# Patient Record
Sex: FEMALE | Race: OTHER | HISPANIC OR LATINO
[De-identification: names, ages, dates, MRNs, and addresses within clinical notes are randomized per-mention and may not be internally consistent; named-entity substitution may affect disease eponyms.]

---

## 2018-02-02 PROBLEM — Z00.00 ENCOUNTER FOR PREVENTIVE HEALTH EXAMINATION: Status: ACTIVE | Noted: 2018-02-02

## 2018-02-05 ENCOUNTER — APPOINTMENT (OUTPATIENT)
Dept: ORTHOPEDIC SURGERY | Facility: CLINIC | Age: 58
End: 2018-02-05
Payer: COMMERCIAL

## 2018-02-05 DIAGNOSIS — M79.9 SOFT TISSUE DISORDER, UNSPECIFIED: ICD-10-CM

## 2018-02-05 PROCEDURE — 99203 OFFICE O/P NEW LOW 30 MIN: CPT

## 2018-11-07 ENCOUNTER — RESULT REVIEW (OUTPATIENT)
Age: 58
End: 2018-11-07

## 2019-07-09 ENCOUNTER — TRANSCRIPTION ENCOUNTER (OUTPATIENT)
Age: 59
End: 2019-07-09

## 2020-11-25 ENCOUNTER — TRANSCRIPTION ENCOUNTER (OUTPATIENT)
Age: 60
End: 2020-11-25

## 2021-04-08 ENCOUNTER — TRANSCRIPTION ENCOUNTER (OUTPATIENT)
Age: 61
End: 2021-04-08

## 2021-11-29 ENCOUNTER — TRANSCRIPTION ENCOUNTER (OUTPATIENT)
Age: 61
End: 2021-11-29

## 2022-08-27 ENCOUNTER — NON-APPOINTMENT (OUTPATIENT)
Age: 62
End: 2022-08-27

## 2022-09-01 ENCOUNTER — NON-APPOINTMENT (OUTPATIENT)
Age: 62
End: 2022-09-01

## 2022-10-03 ENCOUNTER — EMERGENCY (EMERGENCY)
Facility: HOSPITAL | Age: 62
LOS: 1 days | Discharge: HOME | End: 2022-10-03
Attending: EMERGENCY MEDICINE
Payer: COMMERCIAL

## 2022-10-03 VITALS
SYSTOLIC BLOOD PRESSURE: 170 MMHG | WEIGHT: 220.02 LBS | TEMPERATURE: 98 F | HEIGHT: 64.17 IN | OXYGEN SATURATION: 98 % | HEART RATE: 89 BPM | DIASTOLIC BLOOD PRESSURE: 80 MMHG | RESPIRATION RATE: 18 BRPM

## 2022-10-03 PROCEDURE — 99284 EMERGENCY DEPT VISIT MOD MDM: CPT

## 2022-10-03 NOTE — ED ADULT TRIAGE NOTE - CHIEF COMPLAINT QUOTE
nose bleed on and off for a month went to specialist today and had  procedure done to nose, but now bleeding noted persistent as per daughter

## 2022-10-04 VITALS
RESPIRATION RATE: 18 BRPM | TEMPERATURE: 98 F | OXYGEN SATURATION: 98 % | DIASTOLIC BLOOD PRESSURE: 83 MMHG | HEART RATE: 63 BPM | SYSTOLIC BLOOD PRESSURE: 133 MMHG

## 2022-10-04 PROCEDURE — 99283 EMERGENCY DEPT VISIT LOW MDM: CPT

## 2022-10-04 RX ORDER — TRANEXAMIC ACID 100 MG/ML
5 INJECTION, SOLUTION INTRAVENOUS ONCE
Refills: 0 | Status: COMPLETED | OUTPATIENT
Start: 2022-10-04 | End: 2022-10-04

## 2022-10-04 RX ADMIN — TRANEXAMIC ACID 5 MILLILITER(S): 100 INJECTION, SOLUTION INTRAVENOUS at 01:28

## 2022-10-04 NOTE — ED PROVIDER NOTE - OBJECTIVE STATEMENT
Declines  via iPad that I brought into the room, uses daughter at bedside.  62yoF with h/o hypothyr, HLD, on ASA 81 and no other antiplt/coag agents, presents with epistaxis. States has had intermittent epistaxis x 1 mth, saw ENT today and had cautery, however 10pm tonight began bleeding again. Denies all other symptoms and concerns.

## 2022-10-04 NOTE — ED PROVIDER NOTE - PHYSICAL EXAMINATION
Afebrile, hemodynamically stable, saturating well  NAD, well appearing, sitting comfortably in bed, no WOB, speaking full sentences  Head NCAT  EOMI grossly, anicteric  MMM  Small ant bleed  No JVD  RRR, nml S1/S2, no m/r/g  Lungs CTAB, no w/r/r  Abd soft, NT, ND, nml BS, no rebound or guarding  AAO, CN's 3-12 grossly intact  POWERS spontaneously, no leg cyanosis or edema  Skin warm, well perfused, no rashes or hives Afebrile, hemodynamically stable, saturating well  NAD, well appearing, sitting comfortably in bed, no WOB, speaking full sentences  Head NCAT  EOMI grossly, anicteric  MMM  Small ant L nare bleed  RRR  Breathing comfortably on RA  AAO, CN's 3-12 grossly intact  POWERS spontaneously, no leg cyanosis or edema  Skin warm, well perfused, no petechiae

## 2022-10-04 NOTE — ED PROVIDER NOTE - CLINICAL SUMMARY MEDICAL DECISION MAKING FREE TEXT BOX
Epistaxis initially controlled by silver nitrate however rebled. This was stopped with atomized TXA. Observed with no rpt. No e/o other significant cause of bleeding. Patient is well appearing, NAD, afebrile, hemodynamically stable. Discharged with instructions in further symptomatic care, return precautions, and need for close ENT f/u.

## 2022-10-04 NOTE — ED PROVIDER NOTE - PATIENT PORTAL LINK FT
You can access the FollowMyHealth Patient Portal offered by [facility] by registering at the following website: [email or web address]. By joining [employer]’s FollowMyHealth portal, you will also be able to view your health information using other applications (apps) compatible with our system.

## 2022-10-04 NOTE — ED PROVIDER NOTE - NSFOLLOWUPINSTRUCTIONS_ED_ALL_ED_FT
Please follow up with your ENT doctor tomorrow.  Please return to the emergency department if you have worsening bleeding, dizziness, fever, or any other symptoms.    Por favor, lovely un seguimiento con phillips otorrinolaringólogo mañana.  Regrese al departamento de emergencias si tiene un empeoramiento del sangrado, mareos, fiebre o cualquier otro síntoma.    Hemorragia nasal en los adultos    Nosebleed, Adult    Cuando hay hemorragia nasal, sale nannette de la nariz. Las hemorragias nasales son perla afección frecuente y pueden deberse a muchos factores. Por lo general, no indican un problema médico grave.    Siga estas instrucciones en phillips casa:    Si tiene perla hemorragia nasal:   •Siéntese.  •Incline la balbina un poco hacia adelante.  •Siga estos pasos:  1.Presione la nariz con perla toalla o un pañuelo de papel limpios.  2.Continúe presionando la nariz yuri 5 minutos. No deje de hacerlo.  3.Después de 5 minutos, deje de presionar la nariz.  4.Si aún sangra, vuelva a repetir estos pasos. Continúe repitiendo estos pasos hasta que el sangrado se detenga.  • No coloque pañuelos de papel ni otras cosas en la nariz para detener el sangrado.  •Evite acostarse o colocar la balbina hacia atrás.  •Use un aerosol nasal descongestivo según lo indicado por phillips médico.    Después de perla hemorragia nasal:   •Trate de no sonarse ni resoplarse la nariz yuri varias horas.  •Trate de no hacer esfuerzos, levantar objetos ni doblar la cintura para agacharse yuri varios días.  •La aspirina y los medicamentos anticoagulantes aumentan la probabilidad de sangrados. Si mayela estos medicamentos:  •Pregunte a phillips médico si debe interrumpirlos o si debe cambiar la cantidad que mayela.  •No deje de emily los medicamentos excepto que el médico se lo haya indicado.  •Si la causa de la hemorragia fue la sequedad, use gel o aerosol nasal de solución salina de venta nae y un humidificador según se lo haya indicado el médico. [place] mantendrá el interior de la nariz húmeda y le permitirá curarse. Si necesita usar kyra de estos productos:  •Elija kyra que sea soluble en agua.   •Use solamente la cantidad que necesita y con la frecuencia necesaria.   •No se acueste inmediatamente después de usarlo.  •Si tiene hemorragias nasales con frecuencia, hable con el médico sobre los tratamientos. Estos incluyen:  •Cauterización nasal. Se utiliza un hisopo con perla sustancia química o un dispositivo eléctrico con el que se queman ligeramente los vasos sanguíneos diminutos que están dentro de la nariz. [place] ayuda a detener o prevenir las hemorragias nasales.  •Taponamiento nasal. Se coloca perla gasa u otro material en la nariz para ejercer presión omer sobre la romeo de la hemorragia.    Comuníquese con un médico si:  •Tiene fiebre.  •Tiene hemorragias nasales con frecuencia.  •Tiene hemorragias nasales con más frecuencia de lo habitual.  •Presenta moretones con mucha facilidad.  •Tiene algo metido en la nariz.  •Tiene sangrado en la boca.  •Vomita o libera perla sustancia marrón al toser.  •Tiene perla hemorragia nasal después de comenzar un medicamento nuevo.    Solicite ayuda de inmediato si:  •Tiene perla hemorragia nasal después de caerse o lastimarse la balbina.  •Tiene perla hemorragia nasal que no desaparece después de 20 minutos.  •Se siente mareado o débil.  •Tiene hemorragias fuera de lo común en otras partes del cuerpo.  •Tiene moretones fuera de lo común en otras partes del cuerpo.  •Transpira.  •Vomita nannette.    Resumen  •Las hemorragias nasales son frecuentes. Por lo general, no indican un problema médico grave.  •Si tiene perla hemorragia nasal, siéntese e incline la balbina un poco hacia adelante. Presione la nariz con un pañuelo de papel limpio yuri 5 minutos.  •Utilice un aerosol salino o un gel salino y un humidificador según lo indicado por phillips médico.  •Busque ayuda de inmediato si la hemorragia nasal no desaparece después de 20 minutos.    Esta información no tiene roly fin reemplazar el consejo del médico. Asegúrese de hacerle al médico cualquier pregunta que tenga.

## 2022-12-27 ENCOUNTER — NON-APPOINTMENT (OUTPATIENT)
Age: 62
End: 2022-12-27

## 2023-04-09 ENCOUNTER — EMERGENCY (EMERGENCY)
Facility: HOSPITAL | Age: 63
LOS: 1 days | Discharge: HOME | End: 2023-04-09
Attending: EMERGENCY MEDICINE
Payer: COMMERCIAL

## 2023-04-09 VITALS
WEIGHT: 222.01 LBS | RESPIRATION RATE: 17 BRPM | SYSTOLIC BLOOD PRESSURE: 122 MMHG | HEART RATE: 84 BPM | HEIGHT: 63 IN | OXYGEN SATURATION: 96 % | DIASTOLIC BLOOD PRESSURE: 76 MMHG | TEMPERATURE: 99 F

## 2023-04-09 VITALS
OXYGEN SATURATION: 97 % | RESPIRATION RATE: 17 BRPM | DIASTOLIC BLOOD PRESSURE: 66 MMHG | SYSTOLIC BLOOD PRESSURE: 109 MMHG | TEMPERATURE: 99 F | HEART RATE: 78 BPM

## 2023-04-09 LAB
FLUBV RNA SPEC QL NAA+PROBE: DETECTED
RAPID RVP RESULT: DETECTED
SARS-COV-2 RNA SPEC QL NAA+PROBE: SIGNIFICANT CHANGE UP

## 2023-04-09 PROCEDURE — 71045 X-RAY EXAM CHEST 1 VIEW: CPT

## 2023-04-09 PROCEDURE — 99284 EMERGENCY DEPT VISIT MOD MDM: CPT | Mod: 25

## 2023-04-09 PROCEDURE — 0225U NFCT DS DNA&RNA 21 SARSCOV2: CPT

## 2023-04-09 PROCEDURE — 99284 EMERGENCY DEPT VISIT MOD MDM: CPT

## 2023-04-09 PROCEDURE — 71045 X-RAY EXAM CHEST 1 VIEW: CPT | Mod: 26

## 2023-04-09 RX ORDER — IBUPROFEN 200 MG
600 TABLET ORAL ONCE
Refills: 0 | Status: COMPLETED | OUTPATIENT
Start: 2023-04-09 | End: 2023-04-09

## 2023-04-09 RX ORDER — GUAIFENESIN/DEXTROMETHORPHAN 600MG-30MG
10 TABLET, EXTENDED RELEASE 12 HR ORAL
Qty: 280 | Refills: 0
Start: 2023-04-09 | End: 2023-04-15

## 2023-04-09 RX ORDER — IBUPROFEN 200 MG
1 TABLET ORAL
Qty: 28 | Refills: 0
Start: 2023-04-09 | End: 2023-04-15

## 2023-04-09 RX ORDER — DEXAMETHASONE 0.5 MG/5ML
6 ELIXIR ORAL ONCE
Refills: 0 | Status: COMPLETED | OUTPATIENT
Start: 2023-04-09 | End: 2023-04-09

## 2023-04-09 RX ADMIN — Medication 600 MILLIGRAM(S): at 16:03

## 2023-04-09 RX ADMIN — Medication 6 MILLIGRAM(S): at 16:03

## 2023-04-09 RX ADMIN — Medication 200 MILLIGRAM(S): at 16:03

## 2023-04-09 RX ADMIN — Medication 100 MILLIGRAM(S): at 16:03

## 2023-04-09 NOTE — ED PROVIDER NOTE - PATIENT PORTAL LINK FT
You can access the FollowMyHealth Patient Portal offered by [facility] by registering at the following website: [email or web address]. By joining [employer]’s FollowMyHealth portal, you will also be able to view your health information using other applications (apps) compatible with our system.

## 2023-04-09 NOTE — ED PROVIDER NOTE - CLINICAL SUMMARY MEDICAL DECISION MAKING FREE TEXT BOX
62 yr old female with hx of HLD, hypothyrodism presents to ed c/o having cough uri sx since 12/2023 and now past 4 days with uri sx. cough, rhinorrhea, myalgia, fatigue, trouble sleeping. tried otc meds, pcp did swab and was neg and xr which was also neg. no weigh loss, no dysuria, no sick contact..    upper respiratory infection. symptomatic management. ibuprofen 600mg every 6 hrs and/or tylenol 650mg every 4 hrs as needed. stay hydrated. rest. usually worse day 3-4 and improves after 7-10 days. see your MD. return if worsens.   will try jaguar perles, robitussin, motrin, decadron. cxr to r/o pna. no clinical findings suggestive of malignancy but if xr concerning then ct.

## 2023-04-09 NOTE — ED PROVIDER NOTE - OBJECTIVE STATEMENT
62 yr old female with hx of HLD, hypothyrodism presents to ed c/o having cough uri sx since 12/2023 and now past 4 days with uri sx. cough, rhinorrhea, myalgia, fatigue, trouble sleeping. tried otc meds, pcp did swab and was neg and xr which was also neg. no weigh loss, no dysuria, no sick contact.

## 2023-04-09 NOTE — ED PROVIDER NOTE - NSFOLLOWUPINSTRUCTIONS_ED_ALL_ED_FT
upper respiratory infection. symptomatic management. ibuprofen 600mg every 6 hrs and/or tylenol 650mg every 4 hrs as needed. stay hydrated. rest. usually worse day 3-4 and improves after 7-14 days. see your MD. return if worsens.  take meds as prescribed.     infeccion de las vias respiratorias altas. manejo sintomático. ibuprofeno 600 mg cada 6 horas y/o tylenol 650 mg cada 4 horas según sea necesario. Mantente hidratado. descansar. suele empeorar el día 3-4 y mejora después de 7-14 días. consulte a phillips médico. volver si empeora.  emily medicamentos según lo prescrito.

## 2023-04-09 NOTE — ED ADULT NURSE NOTE - NSIMPLEMENTINTERV_GEN_ALL_ED
Implemented All Fall Risk Interventions:  [place] to call system. Call bell, personal items and telephone within reach. Instruct patient to call for assistance. Room bathroom lighting operational. Non-slip footwear when patient is off stretcher. Physically safe environment: no spills, clutter or unnecessary equipment. Stretcher in lowest position, wheels locked, appropriate side rails in place. Provide visual cue, wrist band, yellow gown, etc. Monitor gait and stability. Monitor for mental status changes and reorient to person, place, and time. Review medications for side effects contributing to fall risk. Reinforce activity limits and safety measures with patient and family.

## 2023-04-09 NOTE — ED ADULT NURSE NOTE - OBJECTIVE STATEMENT
Patient c/o worsening Cough x 6 months ,body ache x 4 days. Patient denies NVD. Presents to ED febrile.

## 2023-10-26 ENCOUNTER — NON-APPOINTMENT (OUTPATIENT)
Age: 63
End: 2023-10-26

## 2023-10-29 ENCOUNTER — NON-APPOINTMENT (OUTPATIENT)
Age: 63
End: 2023-10-29

## 2024-05-21 ENCOUNTER — APPOINTMENT (OUTPATIENT)
Dept: CARE COORDINATION | Facility: HOME HEALTH | Age: 64
End: 2024-05-21
Payer: MEDICARE

## 2024-05-21 VITALS — WEIGHT: 230 LBS | BODY MASS INDEX: 39.27 KG/M2 | HEIGHT: 64 IN

## 2024-05-21 DIAGNOSIS — E03.9 HYPOTHYROIDISM, UNSPECIFIED: ICD-10-CM

## 2024-05-21 DIAGNOSIS — E55.9 VITAMIN D DEFICIENCY, UNSPECIFIED: ICD-10-CM

## 2024-05-21 DIAGNOSIS — E78.5 HYPERLIPIDEMIA, UNSPECIFIED: ICD-10-CM

## 2024-05-21 DIAGNOSIS — E66.01 MORBID (SEVERE) OBESITY DUE TO EXCESS CALORIES: ICD-10-CM

## 2024-05-21 DIAGNOSIS — I10 ESSENTIAL (PRIMARY) HYPERTENSION: ICD-10-CM

## 2024-05-21 DIAGNOSIS — R32 UNSPECIFIED URINARY INCONTINENCE: ICD-10-CM

## 2024-05-21 DIAGNOSIS — J45.909 UNSPECIFIED ASTHMA, UNCOMPLICATED: ICD-10-CM

## 2024-05-21 DIAGNOSIS — K21.9 GASTRO-ESOPHAGEAL REFLUX DISEASE W/OUT ESOPHAGITIS: ICD-10-CM

## 2024-05-21 PROCEDURE — G0447 BEHAVIOR COUNSEL OBESITY 15M: CPT | Mod: 93,59

## 2024-05-21 PROCEDURE — 99348 HOME/RES VST EST LOW MDM 30: CPT | Mod: 25

## 2024-05-21 RX ORDER — GABAPENTIN 300 MG/1
300 CAPSULE ORAL
Qty: 1 | Refills: 0 | Status: ACTIVE | COMMUNITY
Start: 2024-05-21

## 2024-05-21 RX ORDER — LEVOTHYROXINE SODIUM 0.1 MG/1
100 TABLET ORAL DAILY
Qty: 1 | Refills: 0 | Status: ACTIVE | COMMUNITY
Start: 2024-05-21

## 2024-05-21 RX ORDER — MIRABEGRON 25 MG/1
25 TABLET, FILM COATED, EXTENDED RELEASE ORAL DAILY
Qty: 1 | Refills: 0 | Status: ACTIVE | COMMUNITY
Start: 2024-05-21

## 2024-05-21 RX ORDER — ATORVASTATIN CALCIUM 20 MG/1
20 TABLET, FILM COATED ORAL
Qty: 1 | Refills: 1 | Status: ACTIVE | COMMUNITY
Start: 2024-05-21

## 2024-05-21 RX ORDER — LISINOPRIL 2.5 MG/1
2.5 TABLET ORAL DAILY
Refills: 0 | Status: ACTIVE | COMMUNITY
Start: 2024-05-21

## 2024-05-21 RX ORDER — ALBUTEROL SULFATE 90 UG/1
108 (90 BASE) INHALANT RESPIRATORY (INHALATION)
Qty: 1 | Refills: 0 | Status: ACTIVE | COMMUNITY
Start: 2024-05-21

## 2024-05-21 RX ORDER — FUROSEMIDE 40 MG/1
40 TABLET ORAL
Qty: 90 | Refills: 3 | Status: ACTIVE | COMMUNITY
Start: 2024-05-21

## 2024-05-21 RX ORDER — PANTOPRAZOLE 40 MG/1
40 TABLET, DELAYED RELEASE ORAL DAILY
Qty: 30 | Refills: 2 | Status: ACTIVE | COMMUNITY
Start: 2024-05-21

## 2024-05-21 RX ORDER — FAMOTIDINE 40 MG/1
40 TABLET, FILM COATED ORAL DAILY
Qty: 90 | Refills: 0 | Status: ACTIVE | COMMUNITY
Start: 2024-05-21

## 2024-05-21 RX ORDER — MONTELUKAST 10 MG/1
10 TABLET, FILM COATED ORAL
Qty: 30 | Refills: 1 | Status: ACTIVE | COMMUNITY
Start: 2024-05-21

## 2024-05-21 RX ORDER — ERGOCALCIFEROL 1.25 MG/1
1.25 MG CAPSULE, LIQUID FILLED ORAL
Qty: 8 | Refills: 0 | Status: ACTIVE | COMMUNITY
Start: 2024-05-21

## 2024-05-21 RX ORDER — OXYBUTYNIN CHLORIDE 5 MG/1
5 TABLET ORAL DAILY
Refills: 0 | Status: ACTIVE | COMMUNITY
Start: 2024-05-21

## 2024-05-21 NOTE — ASSESSMENT
[FreeTextEntry1] : Educated about importance of healthy diet, physical activity, proper sleep (8 hours of sleep), importance of screening test for early detection and treatment of cancer. Importance of immunizations including COVID-19 and seasonal flu vaccine in order to prevent or lessen disease.  Patient encounter incorporated clinical review of the medical record including consultation from specialists, review of lab and diagnostic testing with interpretation and discussion of results with patient and/or primary caretaker, general patient counseling and coordination of care as well documentation update within the electronic medical record. All patient questions/concerns addressed during time of visit.   Time Based Billing: I have spent 30 minutes of time on the encounter.

## 2024-05-21 NOTE — REVIEW OF SYSTEMS
[Lower Ext Edema] : lower extremity edema [Incontinence] : incontinence [Joint Pain] : joint pain [Joint Stiffness] : joint stiffness [Joint Swelling] : joint swelling [Muscle Weakness] : muscle weakness [Confusion] : confusion [Memory Loss] : memory loss [Negative] : Heme/Lymph

## 2024-05-21 NOTE — HISTORY OF PRESENT ILLNESS
[Home] : at home, [unfilled] , at the time of the visit. [Other Location: e.g. Home (Enter Location, City,State)___] : at [unfilled] [FreeTextEntry3] : [place] (daughter) [de-identified] : [facility] quality initiative provider note: 63-year-old female with history of asthma, GERD, HLD, HTN, hypothyroidism. Daughter assist with video visit and provided medical history. Patient report doing well. No complaints offered at this time. Awake, alert and oriented to name and . Daughter claims patient suffer from memory loss, patient unable to complete MRI due to claustrophobia. Daughter plan to follow up with neurology. Patient in no acute distress. Denies any chest pain, shortness of breath, palpitation or dizziness. Daughter verified medications and medical diagnosis. Report taking medications as prescribed.   BP: Occasional monitor BP at home, have not checked in a few days. Machine unavailable during the time of the visit.    PCP: Dr. Moore  Cardio: Dr Bridgett Jimenez

## 2024-05-21 NOTE — HEALTH RISK ASSESSMENT
[No] : In the past 12 months have you used drugs other than those required for medical reasons? No [No falls in past year] : Patient reported no falls in the past year [Assistive Device] : Patient uses an assistive device [Medical reason not done] : Medical reason not done [Patient reported mammogram was normal] : Patient reported mammogram was normal [Patient reported PAP Smear was normal] : Patient reported PAP Smear was normal [Patient reported colonoscopy was normal] : Patient reported colonoscopy was normal [Change in mental status noted] : Change in mental status noted [None] : None [Alone] : lives alone [] :  [# Of Children ___] : has [unfilled] children [Feels Safe at Home] : Feels safe at home [Smoke Detector] : smoke detector [Carbon Monoxide Detector] : carbon monoxide detector [Seat Belt] :  uses seat belt [Never] : Never [de-identified] : cane [Reports changes in hearing] : Reports no changes in hearing [Reports changes in vision] : Reports no changes in vision [Reports normal functional visual acuity (ie: able to read med bottle)] : Reports poor functional visual acuity.  [Reports changes in dental health] : Reports no changes in dental health [Guns at Home] : no guns at home [Travel to Developing Areas] : does not  travel to developing areas [TB Exposure] : is not being exposed to tuberculosis [MammogramDate] : 01/24 [PapSmearComments] : approx. 1 year ago [BoneDensityComments] : approx. 1 year ago - results unknown [ColonoscopyComments] : approx. 2 years ago [de-identified] : Receive 30 hrs of CDPAP service per week

## 2024-05-21 NOTE — PHYSICAL EXAM
[de-identified] : Telehealth precludes traditional, comprehensive physical exam. Patient appeared stable and alert.

## 2024-06-12 NOTE — ED PROVIDER NOTE - IV ALTEPLASE INCLUSION HIDDEN
Christiana Contreras   34 y.o. female MRN:[id number]  Encounter: [id number]    New Patient Consult Note    CC: Hirsutism    Referring Provider:  Leah Arevalo [facility]  [address]  [place] [address]      ASSESSMENT AND PLAN  Assessment:  This is a 34 y.o. female with hirsutism, acne, and recent weight gain.     Plan:  Hirsutism  - Self-reported Ferriman-Gallwey score was 17 during today's visit  - Previous workup for hyperandrogenism has been normal and there was no radiographic evidence of polycystic ovaries on her recent pelvic ultrasound. Will pursue an adrenal workup to further evaluate for hirsutism with a midnight salivary cortisol collection, 17-OH progresterone, and DHEA-S. She was advised to have the lab work completed on day 3 of her menstrual cycle at 8 am.   - Midnight salivary cortisol collection instructions were discussed with her and provider to her in writing on her AVS and her lab paperwork.   - I will call her with the results of the above labs once they are available and to discuss next steps  - Can consider spironolactone for hirsutism in the future     2.  Thymic hyperplasia  - Noted stable on recent imaging  - Low suspicion for underlying Graves' disease or hyperparathyroidism given normal TFTs and calcium levels  - Recommend continued monitoring     3.  Weight gain  - Continue Wegovy per primary team      HISTORY OF PRESENT ILLNESS  HPI:  Christiana Contreras is a 34 y.o. female who presents for evaluation of hirsutism and weight gain. She has a past medical history significant for thymic hyperplasia, anxiety, and depression. She is referred by her OB/GYN, Ely Feng.     Menarche was at age 11 and her cycles currently occur regularly every 24-25 days. She is sexually active but is not seeking fertility. She began norethindrone 1 month ago for her symptoms but has not yet noted any difference. She is anticipating her menstrual cycle to begin soon. Recent pelvic ultrasound did not  demonstrate any ovarian cyst. She is of [place] and [place] ethnicity.    She has noted cystic acne along her cheeks and chin since wearing an N-95 during the pandemic. She failed treatment with many medications with dermatology in the past. She has noted coarse dark hair along her face, chin, upper lips, lower abdomen, upper thighs, and legs which concerned her since her hair is usually blonde. She currently shaves every 3 days, plucks her chin and upper lip hair daily, and waxes her face every 2-3 weeks. She denies use of spironolactone in the past. She has not yet tried laser hair removal or electrolysis. She denies body odor, loss of muscle mass, violaceous striae, easy bruising, or proximal muscle weakness. She endorses a weight gain of nearly 20 lbs over the past 6 months for which she was initiated on Wegovy 0.25 mg weekly 5 weeks ago by her PCP. She has successfully lost some weight on this medication but does not plan to use it long-term. She eats a well-balanced diet and plans to increase her exercise routine during the summer. She denies use of OTC supplements and used natural whey protein in the past.     She denies any family history of reproductive disorders. She vapes once weekly, drinks alcohol once weekly, but denies illicit drug use. 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       Review of Systems   Constitutional:  Positive for unexpected weight change. Negative for chills, fatigue and fever.   HENT:  Negative for ear pain and sore throat.    Eyes:  Negative for pain and visual disturbance.   Respiratory:  Negative for cough and shortness of breath.    Cardiovascular:  Negative for chest pain and palpitations.   Gastrointestinal:  Positive for diarrhea (due to Wegovy side effect). Negative for abdominal pain and vomiting.   Genitourinary:  Negative for dysuria and hematuria.   Musculoskeletal:  Negative for arthralgias, back pain and myalgias.   Skin:  Negative for color change and rash.         No violaceous striae   Neurological:  Negative for seizures and syncope.   Hematological:  Does not bruise/bleed easily.   All other systems reviewed and are negative.      Historical Information   Past Medical History:   Diagnosis Date    Abnormal Pap smear of cervix     Allergies     Anxiety     Depression      Past Surgical History:   Procedure Laterality Date    GYNECOLOGIC CRYOSURGERY       Social History   Social History     Substance and Sexual Activity   Alcohol Use Yes    Comment: socially     Social History     Substance and Sexual Activity   Drug Use No     Social History     Tobacco Use   Smoking Status Some Days    Current packs/day: 0.25    Types: Cigarettes   Smokeless Tobacco Never     Family History:   Family History   Problem Relation Age of Onset    Anemia Mother     Testicular cancer Father     Drug abuse Brother     No Known Problems Son     Lymphoma Maternal Grandmother     Stroke Maternal Grandfather     Breast cancer Paternal Grandmother     No Known Problems Paternal Grandfather     Breast cancer Paternal Aunt     Mental illness Half-Sister     Colon cancer Neg Hx     Ovarian cancer Neg Hx      Meds/Allergies   Current Outpatient Medications   Medication Sig Dispense Refill    cetirizine (ZyrTEC) 5 MG tablet Take 5 mg by mouth daily      norethindrone (MICRONOR) 0.35 MG tablet TAKE 1 TABLET BY MOUTH EVERY DAY 84 tablet 1    Semaglutide-Weight Management (Wegovy) 0.25 MG/0.5ML Inject 0.25 mg under the skin once a week      sertraline (ZOLOFT) 100 mg tablet Take 1 tablet (100 mg total) by mouth daily 90 tablet 1    buPROPion (Wellbutrin SR) 150 mg 12 hr tablet Take 1 tablet (150 mg total) by mouth 2 (two) times a day (Patient not taking: Reported on 6/12/2024) 180 tablet 0     No current facility-administered medications for this visit.     Allergies   Allergen Reactions    Penicillins        OBJECTIVE  Visit Vitals  /72 (BP Location: Left arm, Patient Position: Sitting, Cuff Size:  "Adult)   Ht 5' 8\" (1.727 m)   Wt 76.2 kg (168 lb)   BMI 25.54 kg/m²   OB Status Unknown   Smoking Status Some Days   BSA 1.9 m²       Physical Exam  Constitutional:       Appearance: Normal appearance.   HENT:      Head: Normocephalic and atraumatic.      Comments: No moon facies.     Mouth/Throat:      Mouth: Mucous membranes are moist.      Pharynx: Oropharynx is clear.   Eyes:      Extraocular Movements: Extraocular movements intact.      Pupils: Pupils are equal, round, and reactive to light.   Cardiovascular:      Rate and Rhythm: Normal rate and regular rhythm.      Pulses: Normal pulses.      Heart sounds: Normal heart sounds.   Pulmonary:      Effort: Pulmonary effort is normal. No respiratory distress.      Breath sounds: Normal breath sounds. No wheezing, rhonchi or rales.   Abdominal:      General: There is no distension.      Tenderness: There is no abdominal tenderness. There is no guarding or rebound.   Musculoskeletal:         General: No swelling or tenderness. Normal range of motion.      Cervical back: Normal range of motion and neck supple. No tenderness.      Right lower leg: No edema.      Left lower leg: No edema.      Comments: No proximal muscle weakness   Skin:     General: Skin is warm and dry.      Findings: No abrasion or wound.      Comments: No dark, coarse hair appreciated along face, upper chest, abdomen, arms, or legs as patient recently shaved and plucked. No violaceous striae noted. No bruising noted.    Neurological:      General: No focal deficit present.      Mental Status: She is alert and oriented to person, place, and time.   Psychiatric:         Mood and Affect: Mood normal.         Behavior: Behavior normal.         Lab Results:    Latest Reference Range & Units 08/03/22 16:19 09/14/22 11:31 02/21/24 09:45 03/06/24 10:15   LUTEINIZING HORMONE See Comment mIU/mL    5.0   FSH, POC See Comment mIU/mL    7.5   Hemoglobin A1C Normal 3.8-5.6%; PreDiabetic 5.7-6.4%; Diabetic " >=6.5%; Glycemic control for adults with diabetes <7.0% % 5.3      eAG, EST AVG Glucose mg/dl 105      ESTRADIOL LEVEL See Comment pg/mL    46.4   PROGESTERONE LEVEL See Comment ng/mL    0.51   Testosterone, Total, LC/MS 8 - 60 ng/dL    30   TESTOSTERONE FREE 0.0 - 4.2 pg/mL    1.7   TSH, POC 0.45 - 5.33 uIU/mL   1.48 (E)    TSH 3RD GENERATON 0.450 - 4.500 uIU/mL  0.884     FREE T4 0.61 - 1.12 ng/dL    0.61   (E): External lab result    Imaging Studies:   Study Result    Narrative & Impression   PELVIC ULTRASOUND, COMPLETE     INDICATION: The patient is 34 years old. N93.9: Abnormal uterine and vaginal bleeding, unspecified  L70.9: Acne, unspecified  L68.0: Hirsutism.     COMPARISON: None     TECHNIQUE: Transabdominal pelvic ultrasound was performed in sagittal and transverse planes with a curvilinear transducer. Additional transvaginal imaging was performed to better evaluate the endometrium and ovaries. Imaging included volumetric sweeps as   well as traditional still imaging technique.     FINDINGS:     UTERUS:  The uterus is anteverted in position, measuring 9.7 x 5.4 x 6.3 cm.  Posterior mural leiomyoma measuring 1.2 x 1.3 x 1.3 cm is noted. Left mural leiomyoma measuring 1.3 x 1.0 x 1.2 cm is noted. Left anterior mural and subserosal 3.3 x 2.1 x 2.8 cm leiomyoma is present.  The cervix appears within normal limits.     ENDOMETRIUM:  The endometrial echo complex has an AP caliber of 10.0 mm.  Appearance within normal limits.     OVARIES/ADNEXA:  Right ovary: 3.7 x 2.1 x 1.7 cm. 6.9 mL.  Ovarian Doppler flow is within normal limits.  No suspicious ovarian or adnexal abnormality.     Left ovary: 3.9 x 2.2 x 2.7 cm. 12.0 mL.  Ovarian Doppler flow is within normal limits.  No suspicious ovarian or adnexal abnormality.     OTHER:  No free fluid or loculated fluid collections.     IMPRESSION:     Leiomyomatous uterus.     Workstation performed: [id number]       Study Result    Narrative & Impression   CT CHEST  WITHOUT IV CONTRAST     INDICATION: E32.0: Persistent hyperplasia of thymus.     COMPARISON: 9/14/2020     TECHNIQUE: CT examination of the chest was performed without intravenous contrast. Multiplanar 2D reformatted images were created from the source data.     This examination, like all CT scans performed in the [facility], was performed utilizing techniques to minimize radiation dose exposure, including the use of iterative reconstruction and automated exposure control. Radiation dose length   product (DLP) for this visit: 299 mGy-cm     FINDINGS:     LUNGS: Lungs are clear. There is no tracheal or endobronchial lesion.     PLEURA: Unremarkable.     HEART/GREAT VESSELS: Heart is unremarkable for patient's age. No thoracic aortic aneurysm.     MEDIASTINUM AND NELLY: Persistent non-mass-like anterior mediastinal soft tissue is identified.     CHEST WALL AND LOWER NECK: Unremarkable.     VISUALIZED STRUCTURES IN THE UPPER ABDOMEN: Unremarkable.     OSSEOUS STRUCTURES: No acute fracture or destructive osseous lesion.     IMPRESSION:     Persistent non-mass-like anterior mediastinal soft tissue again potentially representing residual thymus, appearance unchanged from 9/14/2022.     Workstation performed: [id number]     Discussed with the patient and all questioned fully answered. She will call me if any problems arise.   show

## 2024-07-15 ENCOUNTER — EMERGENCY (EMERGENCY)
Facility: HOSPITAL | Age: 64
LOS: 1 days | Discharge: HOME | End: 2024-07-15
Attending: STUDENT IN AN ORGANIZED HEALTH CARE EDUCATION/TRAINING PROGRAM
Payer: MEDICARE

## 2024-07-15 VITALS
TEMPERATURE: 99 F | RESPIRATION RATE: 18 BRPM | HEART RATE: 94 BPM | SYSTOLIC BLOOD PRESSURE: 120 MMHG | DIASTOLIC BLOOD PRESSURE: 68 MMHG | OXYGEN SATURATION: 98 %

## 2024-07-15 VITALS
HEIGHT: 69.29 IN | WEIGHT: 235.01 LBS | SYSTOLIC BLOOD PRESSURE: 145 MMHG | DIASTOLIC BLOOD PRESSURE: 75 MMHG | OXYGEN SATURATION: 97 % | HEART RATE: 90 BPM | RESPIRATION RATE: 18 BRPM | TEMPERATURE: 100 F

## 2024-07-15 LAB
ALBUMIN SERPL ELPH-MCNC: 3.6 G/DL — SIGNIFICANT CHANGE UP (ref 3.5–5)
ALP SERPL-CCNC: 110 U/L — SIGNIFICANT CHANGE UP (ref 40–120)
ALT FLD-CCNC: 23 U/L DA — SIGNIFICANT CHANGE UP (ref 10–60)
ANION GAP SERPL CALC-SCNC: 6 MMOL/L — SIGNIFICANT CHANGE UP (ref 5–17)
APTT BLD: 35.3 SEC — SIGNIFICANT CHANGE UP (ref 24.5–35.6)
AST SERPL-CCNC: 31 U/L — SIGNIFICANT CHANGE UP (ref 10–40)
BASOPHILS # BLD AUTO: 0.05 K/UL — SIGNIFICANT CHANGE UP (ref 0–0.2)
BASOPHILS NFR BLD AUTO: 0.4 % — SIGNIFICANT CHANGE UP (ref 0–2)
BILIRUB SERPL-MCNC: 0.6 MG/DL — SIGNIFICANT CHANGE UP (ref 0.2–1.2)
BUN SERPL-MCNC: 14 MG/DL — SIGNIFICANT CHANGE UP (ref 7–18)
CALCIUM SERPL-MCNC: 9.3 MG/DL — SIGNIFICANT CHANGE UP (ref 8.4–10.5)
CHLORIDE SERPL-SCNC: 107 MMOL/L — SIGNIFICANT CHANGE UP (ref 96–108)
CO2 SERPL-SCNC: 26 MMOL/L — SIGNIFICANT CHANGE UP (ref 22–31)
CREAT SERPL-MCNC: 1.05 MG/DL — SIGNIFICANT CHANGE UP (ref 0.5–1.3)
EGFR: 59 ML/MIN/1.73M2 — LOW
EOSINOPHIL # BLD AUTO: 0.05 K/UL — SIGNIFICANT CHANGE UP (ref 0–0.5)
EOSINOPHIL NFR BLD AUTO: 0.4 % — SIGNIFICANT CHANGE UP (ref 0–6)
FLUAV AG NPH QL: SIGNIFICANT CHANGE UP
FLUBV AG NPH QL: SIGNIFICANT CHANGE UP
GLUCOSE SERPL-MCNC: 122 MG/DL — HIGH (ref 70–99)
HCT VFR BLD CALC: 37.6 % — SIGNIFICANT CHANGE UP (ref 34.5–45)
HGB BLD-MCNC: 12.2 G/DL — SIGNIFICANT CHANGE UP (ref 11.5–15.5)
IMM GRANULOCYTES NFR BLD AUTO: 0.4 % — SIGNIFICANT CHANGE UP (ref 0–0.9)
INR BLD: 1.05 RATIO — SIGNIFICANT CHANGE UP (ref 0.85–1.18)
LYMPHOCYTES # BLD AUTO: 1.06 K/UL — SIGNIFICANT CHANGE UP (ref 1–3.3)
LYMPHOCYTES # BLD AUTO: 8.6 % — LOW (ref 13–44)
MCHC RBC-ENTMCNC: 27.9 PG — SIGNIFICANT CHANGE UP (ref 27–34)
MCHC RBC-ENTMCNC: 32.4 GM/DL — SIGNIFICANT CHANGE UP (ref 32–36)
MCV RBC AUTO: 86 FL — SIGNIFICANT CHANGE UP (ref 80–100)
MONOCYTES # BLD AUTO: 0.91 K/UL — HIGH (ref 0–0.9)
MONOCYTES NFR BLD AUTO: 7.4 % — SIGNIFICANT CHANGE UP (ref 2–14)
NEUTROPHILS # BLD AUTO: 10.19 K/UL — HIGH (ref 1.8–7.4)
NEUTROPHILS NFR BLD AUTO: 82.8 % — HIGH (ref 43–77)
NRBC # BLD: 0 /100 WBCS — SIGNIFICANT CHANGE UP (ref 0–0)
PLATELET # BLD AUTO: 255 K/UL — SIGNIFICANT CHANGE UP (ref 150–400)
POTASSIUM SERPL-MCNC: 4.1 MMOL/L — SIGNIFICANT CHANGE UP (ref 3.5–5.3)
POTASSIUM SERPL-SCNC: 4.1 MMOL/L — SIGNIFICANT CHANGE UP (ref 3.5–5.3)
PROT SERPL-MCNC: 7.5 G/DL — SIGNIFICANT CHANGE UP (ref 6–8.3)
PROTHROM AB SERPL-ACNC: 12 SEC — SIGNIFICANT CHANGE UP (ref 9.5–13)
RBC # BLD: 4.37 M/UL — SIGNIFICANT CHANGE UP (ref 3.8–5.2)
RBC # FLD: 14.2 % — SIGNIFICANT CHANGE UP (ref 10.3–14.5)
SARS-COV-2 RNA SPEC QL NAA+PROBE: SIGNIFICANT CHANGE UP
SODIUM SERPL-SCNC: 139 MMOL/L — SIGNIFICANT CHANGE UP (ref 135–145)
TROPONIN I, HIGH SENSITIVITY RESULT: 24.8 NG/L — SIGNIFICANT CHANGE UP
WBC # BLD: 12.31 K/UL — HIGH (ref 3.8–10.5)
WBC # FLD AUTO: 12.31 K/UL — HIGH (ref 3.8–10.5)

## 2024-07-15 PROCEDURE — 85025 COMPLETE CBC W/AUTO DIFF WBC: CPT

## 2024-07-15 PROCEDURE — 84484 ASSAY OF TROPONIN QUANT: CPT

## 2024-07-15 PROCEDURE — 93010 ELECTROCARDIOGRAM REPORT: CPT

## 2024-07-15 PROCEDURE — 36415 COLL VENOUS BLD VENIPUNCTURE: CPT

## 2024-07-15 PROCEDURE — 99285 EMERGENCY DEPT VISIT HI MDM: CPT

## 2024-07-15 PROCEDURE — 71045 X-RAY EXAM CHEST 1 VIEW: CPT

## 2024-07-15 PROCEDURE — 99285 EMERGENCY DEPT VISIT HI MDM: CPT | Mod: 25

## 2024-07-15 PROCEDURE — 80053 COMPREHEN METABOLIC PANEL: CPT

## 2024-07-15 PROCEDURE — 85730 THROMBOPLASTIN TIME PARTIAL: CPT

## 2024-07-15 PROCEDURE — 87636 SARSCOV2 & INF A&B AMP PRB: CPT

## 2024-07-15 PROCEDURE — 93005 ELECTROCARDIOGRAM TRACING: CPT

## 2024-07-15 PROCEDURE — 71045 X-RAY EXAM CHEST 1 VIEW: CPT | Mod: 26

## 2024-07-15 PROCEDURE — 96374 THER/PROPH/DIAG INJ IV PUSH: CPT

## 2024-07-15 PROCEDURE — 85610 PROTHROMBIN TIME: CPT

## 2024-07-15 RX ORDER — ACETAMINOPHEN 325 MG
1000 TABLET ORAL ONCE
Refills: 0 | Status: COMPLETED | OUTPATIENT
Start: 2024-07-15 | End: 2024-07-15

## 2024-07-15 RX ORDER — BENZOCAINE AND MENTHOL 15; 3.6 MG/1; MG/1
1 LOZENGE ORAL ONCE
Refills: 0 | Status: COMPLETED | OUTPATIENT
Start: 2024-07-15 | End: 2024-07-15

## 2024-07-15 RX ORDER — SODIUM CHLORIDE 0.9 % (FLUSH) 0.9 %
500 SYRINGE (ML) INJECTION ONCE
Refills: 0 | Status: COMPLETED | OUTPATIENT
Start: 2024-07-15 | End: 2024-07-15

## 2024-07-15 RX ADMIN — Medication 1000 MILLILITER(S): at 14:47

## 2024-07-15 RX ADMIN — BENZOCAINE AND MENTHOL 1 LOZENGE: 15; 3.6 LOZENGE ORAL at 17:46

## 2024-07-15 RX ADMIN — Medication 400 MILLIGRAM(S): at 14:48

## 2024-07-15 RX ADMIN — Medication 1000 MILLIGRAM(S): at 15:18

## 2024-11-03 ENCOUNTER — EMERGENCY (EMERGENCY)
Facility: HOSPITAL | Age: 64
LOS: 1 days | Discharge: HOME | End: 2024-11-03
Attending: STUDENT IN AN ORGANIZED HEALTH CARE EDUCATION/TRAINING PROGRAM
Payer: MEDICARE

## 2024-11-03 VITALS
OXYGEN SATURATION: 94 % | RESPIRATION RATE: 18 BRPM | WEIGHT: 220.02 LBS | SYSTOLIC BLOOD PRESSURE: 112 MMHG | TEMPERATURE: 100 F | DIASTOLIC BLOOD PRESSURE: 64 MMHG | HEIGHT: 64 IN | HEART RATE: 84 BPM

## 2024-11-03 VITALS
SYSTOLIC BLOOD PRESSURE: 134 MMHG | RESPIRATION RATE: 18 BRPM | DIASTOLIC BLOOD PRESSURE: 72 MMHG | TEMPERATURE: 98 F | OXYGEN SATURATION: 96 % | HEART RATE: 84 BPM

## 2024-11-03 LAB
FLUAV AG NPH QL: SIGNIFICANT CHANGE UP
FLUBV AG NPH QL: SIGNIFICANT CHANGE UP
RSV RNA NPH QL NAA+NON-PROBE: SIGNIFICANT CHANGE UP
SARS-COV-2 RNA SPEC QL NAA+PROBE: SIGNIFICANT CHANGE UP

## 2024-11-03 PROCEDURE — 96375 TX/PRO/DX INJ NEW DRUG ADDON: CPT

## 2024-11-03 PROCEDURE — 71046 X-RAY EXAM CHEST 2 VIEWS: CPT | Mod: 26

## 2024-11-03 PROCEDURE — 36415 COLL VENOUS BLD VENIPUNCTURE: CPT

## 2024-11-03 PROCEDURE — 96374 THER/PROPH/DIAG INJ IV PUSH: CPT

## 2024-11-03 PROCEDURE — 99284 EMERGENCY DEPT VISIT MOD MDM: CPT | Mod: 25

## 2024-11-03 PROCEDURE — 71046 X-RAY EXAM CHEST 2 VIEWS: CPT

## 2024-11-03 PROCEDURE — 80053 COMPREHEN METABOLIC PANEL: CPT

## 2024-11-03 PROCEDURE — 87637 SARSCOV2&INF A&B&RSV AMP PRB: CPT

## 2024-11-03 PROCEDURE — 94640 AIRWAY INHALATION TREATMENT: CPT

## 2024-11-03 PROCEDURE — 85027 COMPLETE CBC AUTOMATED: CPT

## 2024-11-03 RX ORDER — KETOROLAC TROMETHAMINE 30 MG/ML
15 INJECTION INTRAMUSCULAR; INTRAVENOUS ONCE
Refills: 0 | Status: DISCONTINUED | OUTPATIENT
Start: 2024-11-03 | End: 2024-11-03

## 2024-11-03 RX ORDER — IPRATROPIUM BROMIDE AND ALBUTEROL SULFATE .5; 2.5 MG/3ML; MG/3ML
3 SOLUTION RESPIRATORY (INHALATION) ONCE
Refills: 0 | Status: COMPLETED | OUTPATIENT
Start: 2024-11-03 | End: 2024-11-03

## 2024-11-03 RX ORDER — ALBUTEROL 90 MCG
2 AEROSOL (GRAM) INHALATION
Qty: 1 | Refills: 0
Start: 2024-11-03 | End: 2024-11-06

## 2024-11-03 RX ORDER — HYDROCODONE BITARTRATE AND HOMATROPINE METHYLBROMIDE 1.5; 5 MG/1; MG/1
5 TABLET ORAL
Qty: 40 | Refills: 0
Start: 2024-11-03 | End: 2024-11-06

## 2024-11-03 RX ORDER — DEXAMETHASONE 1.5 MG 1.5 MG/1
6 TABLET ORAL ONCE
Refills: 0 | Status: COMPLETED | OUTPATIENT
Start: 2024-11-03 | End: 2024-11-03

## 2024-11-03 RX ADMIN — IPRATROPIUM BROMIDE AND ALBUTEROL SULFATE 3 MILLILITER(S): .5; 2.5 SOLUTION RESPIRATORY (INHALATION) at 01:57

## 2024-11-03 RX ADMIN — DEXAMETHASONE 1.5 MG 6 MILLIGRAM(S): 1.5 TABLET ORAL at 01:52

## 2024-11-03 RX ADMIN — KETOROLAC TROMETHAMINE 15 MILLIGRAM(S): 30 INJECTION INTRAMUSCULAR; INTRAVENOUS at 01:52

## 2024-11-03 NOTE — ED PROVIDER NOTE - CLINICAL SUMMARY MEDICAL DECISION MAKING FREE TEXT BOX
64-year-old female history of  hyperlipidemia presents with coughing fits productive of blood-streaked sputum without hawk mopped assist, recent surgeries, immobilization, pleurisy.  Has chest pain only with coughing.  No preceding exertional symptoms.  Short of breath during coughing fits but not short of breath in between.  Persistent fevers up to 104 at home no sick contacts or travel.  Scattered expiratory wheezing but otherwise  reassuring vital signs, presentation inconsistent with cardiac pathology more likely viral syndrome versus pneumonia versus bronchitis.  Multimodal analgesia and antitussive medication and reassess pending results.

## 2024-11-03 NOTE — ED PROVIDER NOTE - PATIENT PORTAL LINK FT
You can access the FollowMyHealth Patient Portal offered by [facility] by registering at the following website: [email or web address]. By joining [facility]’s FollowMyHealth portal, you will also be able to view your health information using other applications (apps) compatible with our system.

## 2024-11-03 NOTE — ED PROVIDER NOTE - NS ED ROS FT
Constitutional: no fevers, chills  HEENT: no HA, vision changes, rhinorrhea, sore throat  Cardiac: no chest pain, palpitations  Respiratory: +SOB, cough blood streaked sputum  GI: no n/v/d/c, abd pain, bloody or dark stools  : no dysuria, frequency, or hematuria  MSK: no joint pain, neck pain or back pain  Skin: no rashes, jaundice, pruritis  Neuro: no numbness/tingling, weakness, unsteady gait  ROS otherwise neg except per MDM

## 2024-11-03 NOTE — ED ADULT NURSE NOTE - IN THE PAST 12 MONTHS HAVE YOU USED DRUGS OTHER THAN THOSE REQUIRED FOR MEDICAL REASON?
Right now she is under [employer] and cannot see anyone from [facility]. She is not sure how this happened. She spoke with someone yesterday and had her insurance switched back to [employer] and was told it might not go through until the 1st of the month (5/1/22).    She has been going to [facility] providers for many years and has no desire to change providers this month. She is frustrated as she needs to get imaging done in order to get her hernia surgery which is now going to be delayed. She is also having bad anxiety because of this. She is also having a hard time because she has not had a PCA in 3 months. Additionally notes she is in need of a manual wheelchair and needs to get information about this to Dr. Bullock but is not sure how.     Current plan:  - She is seeing her psychiatrist today who does take [employer]. She will speak with him about anxiety.  - She already spoke with her [facility] worker this morning regarding the CPA. Sounds like the current place she receives services from is understaffed so they may look for a new agency.  - She wonders if she can go to [facility] to get her CT done. Will route to referrals coordination for assistance on this.   - Recommended sending information regarding manual wheelchair to our [place] email address so we can send to Dr. Bullock. ./LR    Routed to referrals coordinator, Dr. Bullock. ./[email or web address]   No

## 2024-11-03 NOTE — ED PROVIDER NOTE - PHYSICAL EXAMINATION
General: non-toxic, NAD  HEENT: NCAT, PERRL, no conjunctival pallor   Cardiac: RRR, no murmurs, 2+ peripheral pulses  Resp: scattered expiratory wheeze, normal work of breathing, coughing fits throughout interview  Abdomen: soft, non-distended, bowel sounds present, no ttp, no rebound or guarding. no organomegaly  Extremities: no peripheral edema, calf tenderness, or leg size discrepancies  Skin: no rashes  Neuro: AAOx4, 5+motor, sensation grossly intact CN 2-12 intact  Psych: mood and affect appropriate
no weight-bearing restrictions

## 2024-11-03 NOTE — ED ADULT TRIAGE NOTE - NS ED TRIAGE AVPU SCALE
Dr. Hernandez
Alert-The patient is alert, awake and responds to voice. The patient is oriented to time, place, and person. The triage nurse is able to obtain subjective information.

## 2024-11-03 NOTE — ED PROVIDER NOTE - PROGRESS NOTE DETAILS
Jemima Velazquez (Rodriguez), DO pt feeling improved after medications. feels comfortable going home. has a pcp for follow up. all questions answered.

## 2024-11-03 NOTE — ED ADULT TRIAGE NOTE - CHIEF COMPLAINT QUOTE
[facility] reports that  pt has  COUGH with  Sputum   with traces of blood , Chest pain   in the   diaphragmatic   area ,, FEVER , Nausea ,Head ache  x4 days  . Last  Tylenol  2200pm   yesterday

## 2024-11-03 NOTE — ED PROVIDER NOTE - NSFOLLOWUPINSTRUCTIONS_ED_ALL_ED_FT
Le atendieron en Urgencias por bronquitis.     1) Avanzar en la actividad según se tolere.   2) Continúe con todos los medicamentos recetados anteriormente según las indicaciones.    3) Win un seguimiento con phillips médico de atención primaria en 24 a 48 horas; tome copias de lindsay resultados.    4) Regresar al Departamento de Emergencias si los síntomas empeoran o persisten, y/o CUALQUIER SÍNTOMA NUEVO O PREOCUPANTE.      Trague perla cucharada de miel a la vez para cubrir phillips garganta y disminuir la cantidad de tos.  También le enviaron un medicamento para la tos a phillips farmacia.  Le enviaron un esteroide a phillips farmacia, que puede emily yuri los próximos 3 días.  Llame a phillips médico por la mañana para informarle que lo atenderán en la kitty de emergencias y programar perla arely de seguimiento lo antes posible.  Regrese si tiene dolor en el pecho al caminar, hinchazón de las piernas, vómitos, dificultad para respirar que empeora o cualquier otro síntoma nuevo o preocupante.    You were seen in the Emergency Department for bronchitis.     1) Advance activity as tolerated.   2) Continue all previously prescribed medications as directed.    3) Follow up with your primary care physician in 24-48 hours - take copies of your results.    4) Return to the Emergency Department for worsening or persistent symptoms, and/or ANY NEW OR CONCERNING SYMPTOMS.      Swallow a tablespoon of honey at a time to coat your throat and decrease the amount of cough.  A cough medication was sent to your pharmacy as well.  A steroid was sent to your pharmacy, which she can take for the next 3 days.  Call your doctor in the morning to tell them you are seen in the ER and make a follow-up appointment as soon as possible.  Return for any chest pain with walking, leg swelling, vomiting, worsening shortness of breath or any other new or concerning symptoms.

## 2025-05-23 NOTE — ED ADULT TRIAGE NOTE - WAS YOUR LAST COVID-19 VACCINE GREATER THAN OR EQUAL TO TWO MONTHS AGO?
CC:  Darin Nueñz is here today for Physical   .    Medications: medications verified, no change  Refills needed today?  NO  Patient would like communication of their results via:  Cell Phone:   Telephone Information:   Mobile [phone]   .  Okay to leave a detailed message containing results? Yes  Advanced directives: No             Health Maintenance       DTaP/Tdap/Td Vaccine (2 - Td or Tdap)  Overdue since 12/9/2023    Colorectal Cancer Screening (Colonoscopy - Every 3 Years)  Overdue since 4/20/2024    COVID-19 Vaccine (3 - 2024-25 season)  Overdue since 9/1/2024    Shingles Vaccine (1 of 2)  Never done    Pneumococcal Vaccine 50+ (1 of 1 - PCV)  Never done           Following review of the above:  Patient is not proceeding with: Colorectal Cancer Screening, COVID-19, Dtap/Tdap/Td, Pneumococcal, and Shingles    Note: Refer to final orders and clinician documentation.            Review Flowsheet  More data exists         5/23/2025   PHQ 2/9 Score   Adult PHQ 2 Score 0   Adult PHQ 2 Interpretation No further screening needed   Little interest or pleasure in activity? Not at all   Feeling down, depressed or hopeless? Not at all        Yes